# Patient Record
Sex: FEMALE | Race: AMERICAN INDIAN OR ALASKA NATIVE
[De-identification: names, ages, dates, MRNs, and addresses within clinical notes are randomized per-mention and may not be internally consistent; named-entity substitution may affect disease eponyms.]

---

## 2020-03-03 ENCOUNTER — HOSPITAL ENCOUNTER (EMERGENCY)
Dept: HOSPITAL 5 - ED | Age: 25
LOS: 1 days | Discharge: HOME | End: 2020-03-04
Payer: SELF-PAY

## 2020-03-03 DIAGNOSIS — R11.2: ICD-10-CM

## 2020-03-03 DIAGNOSIS — J02.9: ICD-10-CM

## 2020-03-03 DIAGNOSIS — Z79.899: ICD-10-CM

## 2020-03-03 DIAGNOSIS — R50.9: ICD-10-CM

## 2020-03-03 DIAGNOSIS — E86.0: Primary | ICD-10-CM

## 2020-03-03 DIAGNOSIS — R00.0: ICD-10-CM

## 2020-03-03 PROCEDURE — 36415 COLL VENOUS BLD VENIPUNCTURE: CPT

## 2020-03-03 PROCEDURE — 93010 ELECTROCARDIOGRAM REPORT: CPT

## 2020-03-03 PROCEDURE — 96361 HYDRATE IV INFUSION ADD-ON: CPT

## 2020-03-03 PROCEDURE — 96374 THER/PROPH/DIAG INJ IV PUSH: CPT

## 2020-03-03 PROCEDURE — 84703 CHORIONIC GONADOTROPIN ASSAY: CPT

## 2020-03-03 PROCEDURE — 87430 STREP A AG IA: CPT

## 2020-03-03 PROCEDURE — 85027 COMPLETE CBC AUTOMATED: CPT

## 2020-03-03 PROCEDURE — 87400 INFLUENZA A/B EACH AG IA: CPT

## 2020-03-03 PROCEDURE — 86308 HETEROPHILE ANTIBODY SCREEN: CPT

## 2020-03-03 PROCEDURE — 96375 TX/PRO/DX INJ NEW DRUG ADDON: CPT

## 2020-03-03 PROCEDURE — 93005 ELECTROCARDIOGRAM TRACING: CPT

## 2020-03-03 PROCEDURE — 87116 MYCOBACTERIA CULTURE: CPT

## 2020-03-03 PROCEDURE — 99283 EMERGENCY DEPT VISIT LOW MDM: CPT

## 2020-03-03 PROCEDURE — 80053 COMPREHEN METABOLIC PANEL: CPT

## 2020-03-04 VITALS — SYSTOLIC BLOOD PRESSURE: 121 MMHG | DIASTOLIC BLOOD PRESSURE: 66 MMHG

## 2020-03-04 LAB
ALBUMIN SERPL-MCNC: 4.5 G/DL (ref 3.9–5)
ALT SERPL-CCNC: 25 UNITS/L (ref 7–56)
BUN SERPL-MCNC: 9 MG/DL (ref 7–17)
BUN/CREAT SERPL: 13 %
CALCIUM SERPL-MCNC: 9.7 MG/DL (ref 8.4–10.2)
HCT VFR BLD CALC: 41.5 % (ref 30.3–42.9)
HEMOLYSIS INDEX: 7
HGB BLD-MCNC: 13.8 GM/DL (ref 10.1–14.3)
MCHC RBC AUTO-ENTMCNC: 33 % (ref 30–34)
MCV RBC AUTO: 80 FL (ref 79–97)
PLATELET # BLD: 201 K/MM3 (ref 140–440)
RBC # BLD AUTO: 5.17 M/MM3 (ref 3.65–5.03)

## 2020-03-04 NOTE — EMERGENCY DEPARTMENT REPORT
ED Fever HPI





- General


Chief Complaint: Sore Throat


Stated Complaint: SORE THROAT  VOMITING  FEVER  CONSTIPATION


Time Seen by Provider: 03/04/20 00:11


Source: patient


Exam Limitations: no limitations





- History of Present Illness


Initial Comments: 





Patient is a 24-year-old female that presents emergency room with complaints of 

sore throat, nausea vomiting, fever.  Patient states she is also having 

constipation.  Patient denies abdominal pain.  Patient denies blood in her 

vomitus.  Patient states that she has not been able to hold anything down for 2 

full days.  Patient states her throat pain is a 10 out of 10.  Patient states 

that it is worse with swallowing and eating.  Patient states is better with 

rest.  Patient states her fever is low-grade.  Patient denies taking any type of

over-the-counter medications.  Patient denies travel outside the country.  

Patient denies contact with people who have traveled to [place].


Fever Severity/Quality: low grade


Fever Therapy PTA: none


Associated Symptoms: cough, nausea/vomiting, sore throat.  denies: abdominal 

pain, chest pain, confusion, diaphoresis, headache, muscle aches, rash, 

shortness of breath, stiff neck, syncope, weakness





ED Review of Systems


ROS: 


Stated complaint: SORE THROAT  VOMITING  FEVER  CONSTIPATION


Other details as noted in HPI





Constitutional: chills, fever


Eyes: denies: eye pain, eye discharge, vision change


ENT: throat pain.  denies: ear pain


Respiratory: cough.  denies: shortness of breath, wheezing


Cardiovascular: denies: chest pain, palpitations


Endocrine: no symptoms reported


Gastrointestinal: nausea, vomiting, constipation.  denies: abdominal pain, 

diarrhea


Genitourinary: denies: urgency, dysuria, discharge


Musculoskeletal: denies: back pain, joint swelling, arthralgia


Skin: denies: rash, lesions


Neurological: denies: headache, weakness, paresthesias


Psychiatric: denies: anxiety, depression


Hematological/Lymphatic: denies: easy bleeding, easy bruising





ED Past Medical Hx





- Past Medical History


Previous Medical History?: No





- Surgical History


Past Surgical History?: No





- Family History


Family history: no significant





- Social History


Smoking Status: Never Smoker


Substance Use Type: None





- Medications


Home Medications: 


                                Home Medications











 Medication  Instructions  Recorded  Confirmed  Last Taken  Type


 


Fluconazole [Diflucan] 150 mg PO QDAY 1 Days #1 tablet 03/16/19  Unknown Rx


 


metroNIDAZOLE [Metronidazole] 500 mg PO Q8H 7 Days #21 tablet 03/16/19  Unknown 

Rx


 


Azithromycin [Zithromax Tri-Aquiles] 500 mg PO DAILY 3 Days #3 tablet 03/04/20  

Unknown Rx


 


Ondansetron [Zofran Odt] 4 mg PO Q6HR PRN #20 tab.rapdis 03/04/20  Unknown Rx


 


methylPREDNISolone [Medrol 4MG 4 mg PO DAILY 6 Days #1 tab.ds.pk 03/04/20  

Unknown Rx





DOSEPAK (21 tabs)]     














ED Physical Exam





- General


Limitations: No Limitations


General appearance: alert, in no apparent distress





- Head


Head exam: Present: atraumatic, normocephalic





- Eye


Eye exam: Present: normal appearance





- ENT


ENT exam: Present: mucous membranes dry, other





- Expanded ENT Exam


  ** Expanded


Throat exam: Positive: tonsillar erythema, other (Oropharynx redness.).  

Negative: tonsillomegaly, tonsillar exudate, R peritonsillar mass, L 

peritonsillar mass





- Neck


Neck exam: Present: normal inspection





- Respiratory


Respiratory exam: Present: normal lung sounds bilaterally.  Absent: respiratory 

distress, wheezes, rales, rhonchi, stridor, chest wall tenderness, accessory 

muscle use, decreased breath sounds





- Cardiovascular


Cardiovascular Exam: Present: regular rate, normal rhythm.  Absent: systolic 

murmur, diastolic murmur, rubs, gallop





- GI/Abdominal


GI/Abdominal exam: Present: soft, normal bowel sounds





- Extremities Exam


Extremities exam: Present: normal inspection





- Back Exam


Back exam: Present: normal inspection





- Neurological Exam


Neurological exam: Present: alert, oriented X3





- Psychiatric


Psychiatric exam: Present: normal affect, normal mood





- Skin


Skin exam: Present: warm, dry, intact, normal color.  Absent: rash





ED Course


                                   Vital Signs











  03/03/20 03/04/20 03/04/20





  23:45 00:15 00:30


 


Temperature 99.9 F H  


 


Pulse Rate 146 H 135 H 135 H


 


Respiratory 18 13 25 H





Rate   


 


Blood Pressure 118/80 143/87 127/83


 


O2 Sat by Pulse 95 97 96





Oximetry   














- Reevaluation(s)


Reevaluation #1: 


Initial evaluation done.  EKG shows sinus tachycardia.  Patient will be given 

fluids.


03/04/20 00:11





Reevaluation #2: 


Full started and patient's heart rate is already decreased.


03/04/20 00:39








Heart rate at 125.  Patient states she is feeling a little bit better.


03/04/20 01:03





Reevaluation #3: 


Patient's heart rate continues to improve.  Patient states she does not have any

vomiting but would like something to help with nausea and her throat pain.


03/04/20 01:49





Reevaluation #4: 


Patient states she is feeling much better.  Patient denies fever.  Patient den

ies pain.  Patient states her throat is almost pain-free.  Patient stable for 

discharge.  I discussed all results with patient.  Discussed plan of care with 

patient.  Patient agrees with plan of care.  Patient will be discharged home.  

Patient stable for discharge.  Patient given discharge instructions.  Patient 

voiced understanding of discharge instructions.


03/04/20 03:23








ED Medical Decision Making





- Lab Data


Result diagrams: 


                                 03/04/20 00:32





                                 03/04/20 00:32





- EKG Data


-: EKG Interpreted by Me


EKG shows normal: sinus rhythm, axis, intervals, QRS complexes, ST-T waves


Rate: tachycardia





- Medical Decision Making





Patient is a 24-year-old female that presents emergency room with complaints of 

fever, sore throat, cough.  Patient found to have a severely red throat on 

examination.  Patient also found to have sinus tachycardia with a rate of 42 on 

initial EKG.  Patient was given 2 L of fluids and her heart rate responded well.

 Patient's pain almost resolved just prior to discharge.  Patient given a Z-Aquiles 

and steroids for her pharyngitis.  Patient's fever improved with treatment.  

Patient's labs essentially unremarkable.  Patient's flu negative.  Patient strep

and mono negative.  Patient stable for discharge and discharged home.





- Differential Diagnosis


URI, pharyngitis, strep, flu, tachycardia, dehydration


Critical Care Time: Yes


Critical care time in (mins) excluding proc time.: 35


Critical care attestation.: 


If time is entered above; I have spent that time in minutes in the direct care 

of this critically ill patient, excluding procedure time.





Critical Care Time: 





35 minutes





ED Disposition


Clinical Impression: 


 Tachycardia, Sore throat, Dehydration, Nausea





Pharyngitis


Qualifiers:


 Pharyngitis/tonsillitis etiology: other specified organisms Qualified Code(s): 

J02.8 - Acute pharyngitis due to other specified organisms





Fever


Qualifiers:


 Fever type: unspecified Qualified Code(s): R50.9 - Fever, unspecified





Disposition: DC-01 TO HOME OR SELFCARE


Is pt being admited?: No


Does the pt Need Aspirin: No


Condition: Stable


Instructions:  Pharyngitis (ED), Fever in Adults (ED), Dehydration (ED)


Additional Instructions: 


Patient to follow-up with primary care in 2 to 3 days.    Patient to rest.  

Patient to increase water.  Patient to take Tylenol or ibuprofen as needed for 

pain.  Patient to take meds as directed.  Patient to return to the ER if 

condition worsens, changes or new symptoms arise.


Prescriptions: 


methylPREDNISolone [Medrol 4MG DOSEPAK (21 tabs)] 4 mg PO DAILY 6 Days #1 

tab.ds.pk


Azithromycin [Zithromax Tri-Aquiles] 500 mg PO DAILY 3 Days #3 tablet


Ondansetron [Zofran Odt] 4 mg PO Q6HR PRN #20 tab.rapdis


 PRN Reason: Nausea And Vomiting


Referrals: 


CHRISTOPHE BASURTO MD [Staff Physician] - 2-3 Days


Time of Disposition: 03:30

## 2021-02-16 ENCOUNTER — HOSPITAL ENCOUNTER (EMERGENCY)
Dept: HOSPITAL 5 - ED | Age: 26
LOS: 1 days | End: 2021-02-17
Payer: SELF-PAY

## 2021-02-16 DIAGNOSIS — O26.891: Primary | ICD-10-CM

## 2021-02-16 DIAGNOSIS — Z79.899: ICD-10-CM

## 2021-02-16 DIAGNOSIS — R55: ICD-10-CM

## 2021-02-16 DIAGNOSIS — Z3A.01: ICD-10-CM

## 2021-02-16 LAB
ALBUMIN SERPL-MCNC: 4.9 G/DL (ref 3.9–5)
ALT SERPL-CCNC: 18 UNITS/L (ref 7–56)
BASOPHILS # (AUTO): 0 K/MM3 (ref 0–0.1)
BASOPHILS NFR BLD AUTO: 0.4 % (ref 0–1.8)
BILIRUB UR QL STRIP: (no result)
BLOOD UR QL VISUAL: (no result)
BUN SERPL-MCNC: 13 MG/DL (ref 7–17)
BUN/CREAT SERPL: 22 %
CALCIUM SERPL-MCNC: 10.2 MG/DL (ref 8.4–10.2)
EOSINOPHIL # BLD AUTO: 0.2 K/MM3 (ref 0–0.4)
EOSINOPHIL NFR BLD AUTO: 2 % (ref 0–4.3)
HCT VFR BLD CALC: 44.5 % (ref 30.3–42.9)
HEMOLYSIS INDEX: 3
HGB BLD-MCNC: 14.7 GM/DL (ref 10.1–14.3)
LYMPHOCYTES # BLD AUTO: 2 K/MM3 (ref 1.2–5.4)
LYMPHOCYTES NFR BLD AUTO: 25.4 % (ref 13.4–35)
MCHC RBC AUTO-ENTMCNC: 33 % (ref 30–34)
MCV RBC AUTO: 82 FL (ref 79–97)
MONOCYTES # (AUTO): 0.6 K/MM3 (ref 0–0.8)
MONOCYTES % (AUTO): 7.8 % (ref 0–7.3)
MUCOUS THREADS #/AREA URNS HPF: (no result) /HPF
PH UR STRIP: 5 [PH] (ref 5–7)
PLATELET # BLD: 256 K/MM3 (ref 140–440)
PROT UR STRIP-MCNC: (no result) MG/DL
RBC # BLD AUTO: 5.42 M/MM3 (ref 3.65–5.03)
RBC #/AREA URNS HPF: 1 /HPF (ref 0–6)
UROBILINOGEN UR-MCNC: < 2 MG/DL (ref ?–2)
WBC #/AREA URNS HPF: < 1 /HPF (ref 0–6)

## 2021-02-16 PROCEDURE — 83690 ASSAY OF LIPASE: CPT

## 2021-02-16 PROCEDURE — 82550 ASSAY OF CK (CPK): CPT

## 2021-02-16 PROCEDURE — 85025 COMPLETE CBC W/AUTO DIFF WBC: CPT

## 2021-02-16 PROCEDURE — 84484 ASSAY OF TROPONIN QUANT: CPT

## 2021-02-16 PROCEDURE — 84703 CHORIONIC GONADOTROPIN ASSAY: CPT

## 2021-02-16 PROCEDURE — 81001 URINALYSIS AUTO W/SCOPE: CPT

## 2021-02-16 PROCEDURE — 76801 OB US < 14 WKS SINGLE FETUS: CPT

## 2021-02-16 PROCEDURE — 36415 COLL VENOUS BLD VENIPUNCTURE: CPT

## 2021-02-16 PROCEDURE — 85379 FIBRIN DEGRADATION QUANT: CPT

## 2021-02-16 PROCEDURE — 93005 ELECTROCARDIOGRAM TRACING: CPT

## 2021-02-16 PROCEDURE — 84702 CHORIONIC GONADOTROPIN TEST: CPT

## 2021-02-16 PROCEDURE — 82553 CREATINE MB FRACTION: CPT

## 2021-02-16 PROCEDURE — 80053 COMPREHEN METABOLIC PANEL: CPT
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
## 2021-02-17 VITALS — DIASTOLIC BLOOD PRESSURE: 81 MMHG | SYSTOLIC BLOOD PRESSURE: 115 MMHG

## 2021-02-17 NOTE — ULTRASOUND REPORT
ULTRASOUND OBSTETRIC 



INDICATION / CLINICAL INFORMATION:

Pregnant, lower abdominal pain, syncope.



TECHNIQUE:

Transabdominal.



COMPARISON:

None available.



FINDINGS:

No IUP.



ADNEXA: Complex right ovarian corpus luteal cyst measures 2.1 x 2.2 x 2.0 cm. Complex left ovarian co
rpus luteal cyst measures 5.4 x 5.0 x 4.5 cm containing internal debris and septations.

FREE FLUID: None.



ADDITIONAL FINDINGS: None.



IMPRESSION:

1. No visualized IUP.

2. Complex bilateral ovarian corpus luteal cysts



Signer Name: Ronen Buitrago MD 

Signed: 2/17/2021 2:35 AM

Workstation Name: [id number]

## 2021-03-01 ENCOUNTER — HOSPITAL ENCOUNTER (EMERGENCY)
Dept: HOSPITAL 5 - ED | Age: 26
Discharge: HOME | End: 2021-03-01
Payer: COMMERCIAL

## 2021-03-01 VITALS — DIASTOLIC BLOOD PRESSURE: 84 MMHG | SYSTOLIC BLOOD PRESSURE: 124 MMHG

## 2021-03-01 DIAGNOSIS — Z3A.01: ICD-10-CM

## 2021-03-01 DIAGNOSIS — Z79.899: ICD-10-CM

## 2021-03-01 DIAGNOSIS — Z79.2: ICD-10-CM

## 2021-03-01 DIAGNOSIS — O20.0: Primary | ICD-10-CM

## 2021-03-01 DIAGNOSIS — O23.41: ICD-10-CM

## 2021-03-01 LAB
BACTERIA #/AREA URNS HPF: (no result) /HPF
BILIRUB UR QL STRIP: (no result)
BLOOD UR QL VISUAL: (no result)
MUCOUS THREADS #/AREA URNS HPF: (no result) /HPF
PH UR STRIP: 5 [PH] (ref 5–7)
PROT UR STRIP-MCNC: (no result) MG/DL
RBC #/AREA URNS HPF: 2 /HPF (ref 0–6)
UROBILINOGEN UR-MCNC: 2 MG/DL (ref ?–2)
WBC #/AREA URNS HPF: 2 /HPF (ref 0–6)

## 2021-03-01 PROCEDURE — 81001 URINALYSIS AUTO W/SCOPE: CPT

## 2021-03-01 PROCEDURE — 84702 CHORIONIC GONADOTROPIN TEST: CPT

## 2021-03-01 PROCEDURE — 85461 HEMOGLOBIN FETAL: CPT

## 2021-03-01 PROCEDURE — 76801 OB US < 14 WKS SINGLE FETUS: CPT

## 2021-03-01 PROCEDURE — 86900 BLOOD TYPING SEROLOGIC ABO: CPT

## 2021-03-01 PROCEDURE — 87086 URINE CULTURE/COLONY COUNT: CPT

## 2021-03-01 PROCEDURE — 86901 BLOOD TYPING SEROLOGIC RH(D): CPT

## 2021-03-01 PROCEDURE — 76817 TRANSVAGINAL US OBSTETRIC: CPT

## 2021-03-01 PROCEDURE — 36415 COLL VENOUS BLD VENIPUNCTURE: CPT

## 2021-03-01 PROCEDURE — 86850 RBC ANTIBODY SCREEN: CPT

## 2021-03-01 NOTE — EMERGENCY DEPARTMENT REPORT
<GALISUKHILIZABETHElsa DAS - Last Filed: 21 14:02>





ED Female  HPI





- General


Chief complaint: Vaginal Bleeding


Stated complaint: PREG/BLEEDING/SEEN HERE LAST WEEK


Source: patient


Mode of arrival: Ambulatory


Limitations: No Limitations





- History of Present Illness


Initial comments: 





25-year-old -American female who is  1 para 0 with a last m

enstrual period of 2021 presents to the emergency room complaining of lower

abdominal pain with vaginal spotting.  Patient was seen here 2 weeks ago and was

having very heavy menstrual bleeding.  Patient never followed up with provider 

stating that her Medicaid was not active.  Patient comes in complaining of 

burning with urination.


MD Complaint: vaginal bleeding, pelvic pain





- Related Data


                                  Previous Rx's











 Medication  Instructions  Recorded  Last Taken  Type


 


Fluconazole [Diflucan] 150 mg PO QDAY 1 Days #1 tablet 19 Unknown Rx


 


metroNIDAZOLE [Metronidazole] 500 mg PO Q8H 7 Days #21 tablet 19 Unknown 

Rx


 


Azithromycin [Zithromax Tri-Aquiles] 500 mg PO DAILY 3 Days #3 tablet 20 

Unknown Rx


 


Ondansetron [Zofran Odt] 4 mg PO Q6HR PRN #20 tab.rapdis 20 Unknown Rx


 


methylPREDNISolone [Medrol 4MG 4 mg PO DAILY 6 Days #1 tab.ds.pk 20 

Unknown Rx





DOSEPAK (21 tabs)]    


 


cephALEXin [Keflex] 500 mg PO Q8HR #21 capsule 21 Unknown Rx











                                    Allergies











Allergy/AdvReac Type Severity Reaction Status Date / Time


 


No Known Allergies Allergy   Verified 21 13:21














ED Review of Systems


Comment: All other systems reviewed and negative





ED Past Medical Hx





- Past Medical History


Previous Medical History?: No





- Surgical History


Past Surgical History?: No





- Social History


Smoking Status: Never Smoker


Substance Use Type: None (Denies illicit drug use)





- Medications


Home Medications: 


                                Home Medications











 Medication  Instructions  Recorded  Confirmed  Last Taken  Type


 


Fluconazole [Diflucan] 150 mg PO QDAY 1 Days #1 tablet 19  Unknown Rx


 


metroNIDAZOLE [Metronidazole] 500 mg PO Q8H 7 Days #21 tablet 19  Unknown 

Rx


 


Azithromycin [Zithromax Tri-Aquiles] 500 mg PO DAILY 3 Days #3 tablet 20  

Unknown Rx


 


Ondansetron [Zofran Odt] 4 mg PO Q6HR PRN #20 tab.rapdis 20  Unknown Rx


 


methylPREDNISolone [Medrol 4MG 4 mg PO DAILY 6 Days #1 tab.ds.pk 20  

Unknown Rx





DOSEPAK (21 tabs)]     


 


cephALEXin [Keflex] 500 mg PO Q8HR #21 capsule 21  Unknown Rx














ED Physical Exam





- General


Limitations: No Limitations





ED Medical Decision Making





- Medical Decision Making





25-year-old -American female who is  1 para 0 with a last 

menstrual period of 2021 presents to the emergency room complaining of 

lower abdominal pain with vaginal spotting.  Patient was seen here 2 weeks ago 

and was having very heavy menstrual bleeding.  Patient never followed up with 

provider stating that her Medicaid was not active.  Patient comes in complaining

 of burning with urination.





ED Disposition


Clinical Impression: 


 Threatened miscarriage in early pregnancy, UTI (urinary tract infection)





Disposition:  TO HOME OR SELFCARE


Condition: Stable


Instructions:  Urinary Tract Infection, Adult, Easy-to-Read, Threatened 

Miscarriage, Easy-to-Read


Additional Instructions: 


You can take tylenol as needed for pain. Take the antibiotics as prescribed. 

Follow up with [facility] in 2 days for repeat Quant HCG and repeat US, 

if unable to f/u return to ED in 2 days. Return sooner if symptoms worsens. 


Prescriptions: 


cephALEXin [Keflex] 500 mg PO Q8HR #21 capsule


Referrals: 


PRIMARY CARE,MD [Primary Care Provider] - 3-5 Days


Forms:  Work/School Release Form(ED)





<KYA WINSLOW - Last Filed: 21 02:13>





ED Review of Systems


ROS: 


Stated complaint: PREG/BLEEDING/SEEN HERE LAST WEEK


Other details as noted in HPI








ED Course


                                   Vital Signs











  21





  13:23 21:56


 


Temperature 98.4 F 


 


Pulse Rate 95 H 80


 


Respiratory 18 18





Rate  


 


Blood Pressure 131/76 


 


Blood Pressure  124/84





[Right]  


 


O2 Sat by Pulse 100 100





Oximetry  














ED Medical Decision Making





- Radiology Data


Radiology results: report reviewed


Findings





[facility] 


[address] [place] [address] 





Ultrasound Report 


Signed 





 Patient: ELBERT LEWIS MR#: 


 [id number] 


 : 1995 Acct:[id number] 





 Age/Sex: 25 / F ADM Date: 21 





 Loc: ED 


 Attending Dr: 








 Ordering Physician: ROSALINDA IPLLAI 


 Date of Service: 21 


 Procedure(s): US OB <= 14 weeks fetus 


 Accession Number(s): [id number] 





 cc: ROSALINDA PILLAI 








 OB Ultrasound 





HISTORY: Pregnant lower abdominal pain. 





TECHNIQUE: Grayscale and color imaging performed. 





COMPARISON: OB ultrasound from 2021 





FINDINGS: Uterus measures 9.6 x 4.5 x 6.1 cm with endometrial echocomplex 

appearing thickened and 


 measuring 2.1 cm. There is a small cystic structure in the uterine fundal 

region with diameter of 8 


 mm which would correspond with an EGA of 5 weeks and 4 days. No fetal pole or 

heart rate identified.


 There is also a crescentic slightly hypoechoic structure adjacent to this 

possible gestational sac 


 measuring 2.1 cm in maximal dimension which may represent a small subchorionic 

hemorrhage. 





Right ovary measures 5.7 x 2.9 x 4.6 cm and contains a simple cyst measuring 2.9

 cm in maximal 


 dimension. Left ovary measures 8.4 x 8.6 x 8.2 cm with a simple cyst measuring 

7.5 cm in maximal 


 dimension. No significant pelvic free fluid identified. 








IMPRESSION: 


1. Intrauterine cystic structure with no fetal pole could represent an early 

gestational sac. 


 Correlate with beta hCG level and serial ultrasound as indicated. Irregular 

adjacent hypoechoic 


 structure may represent a subchorionic hemorrhage. 


2. Simple bilateral ovarian cysts. 





Signer Name: Tony Rodriguez MD 


Signed: 3/1/2021 5:39 PM 


Workstation Name: [email or web address] 








 Transcribed By: IESHA 


 Dictated By: Tony Rodriguez MD 


 Electronically Authenticated By: Tony Rodriguez MD 


 Signed Date/Time: 21 











 DD/DT: 21 


 TD/TT: 








- Medical Decision Making


: Patient was initially seen by Lizabeth Carpenter.  She turned over the case to

 Dominick BAILON at shift change which was about 1730. US was pending. Per nurse 

and Dominick patient was called several times for re-assessment but no there 

was no answer from patient and she was assumed to have Eloped. Then around ,

 nurse brought chart to me stating that they had found patient in waiting room 

and nurse requested that I see patient. 





Patient reported to me that she has been having intermittent lower abd cramping 

x 2 weeks but she started with spotting today. She states since she has been in 

ER spotting has resolved. She also reports dysuria. She is G1 P 0. Her last MC 

was 21. She states she is still waiting to get approved for her medicaid 

and therefore has not had a prenatal appointment as yet but she is hoping to go 

to [facility]. 





: patient well appearing, not toxic, and not in any distress. She 

neurologically intact with normal gait in ED. She appears well hydrated.  Repeat

 VS stable. Mild diffuse lower abd ttp but abd soft without guarding, rigidity 

or rebound. Labs/US reviewed. Quant now 3798 compared to 33.71 on 21. 


Ultrasound today shows Intrauterine cystic structure with no fetal pole could 

represent an early gestational sac. Correlate with beta hCG level and serial 

ultrasound as indicated. Irregular adjacent hypoechoic structure may represent a

 subchorionic hemorrhage. 2. Simple bilateral ovarian cysts.  UA concerning for 

UTI.  Urine culture is pending.  RhoGam is A positive so no indication for any 

RhoGam at this time.





Discussed lab results and ultrasound results with patient.  Recommend that she 

follows up with outside medical in the next 2 days for repeat quant and repeat 

ultrasound.  Also informed her of the concern for possible UTI and that she will

 be started on antibiotics.  Discussed the suspected diagnosis with patient.  

She is not toxic, not ill-appearing, and currently in no acute distress.  Her 

vital signs have been stable.  She is neurologically intact with a normal gait. 

 She appears well-hydrated.  No indication for any further work-up, admission or

 emergent consult at this time.  Patient expressed understanding of instructions

 and agree with plan.  Patient was stable at time of discharge.


Critical care attestation.: 


If time is entered above; I have spent that time in minutes in the direct care 

of this critically ill patient, excluding procedure time.








ED Disposition


Is pt being admited?: No


Does the pt Need Aspirin: No


Time of Disposition: 21:48

## 2021-03-01 NOTE — ULTRASOUND REPORT
OB Ultrasound



HISTORY: Pregnant lower abdominal pain.



TECHNIQUE:  Grayscale and color  imaging performed.



COMPARISON:  OB ultrasound from 2/17/2021



FINDINGS: Uterus measures 9.6 x 4.5 x 6.1 cm with endometrial echocomplex appearing thickened and silvina
suring 2.1 cm. There is a small cystic structure in the uterine fundal region with diameter of 8 mm w
hich would correspond with an EGA of 5 weeks and 4 days. No fetal pole or heart rate identified. Ther
e is also a crescentic slightly hypoechoic structure adjacent to this possible gestational sac measur
ing 2.1 cm in maximal dimension which may represent a small subchorionic hemorrhage.



Right ovary measures 5.7 x 2.9 x 4.6 cm and contains a simple cyst measuring 2.9 cm in maximal dimens
ion. Left ovary measures 8.4 x 8.6 x 8.2 cm with a simple cyst measuring 7.5 cm in maximal dimension.
 No significant pelvic free fluid identified.





IMPRESSION: 

1. Intrauterine cystic structure with no fetal pole could represent an early gestational sac. Correla
te with beta hCG level and serial ultrasound as indicated. Irregular adjacent hypoechoic structure ma
y represent a subchorionic hemorrhage.

2. Simple bilateral ovarian cysts.



Signer Name: Tony Rodriguez MD 

Signed: 3/1/2021 5:39 PM

Workstation Name: [email or web address]

## 2021-03-01 NOTE — ULTRASOUND REPORT
OB Ultrasound



HISTORY: Pregnant lower abdominal pain.



TECHNIQUE:  Grayscale and color  imaging performed.



COMPARISON:  OB ultrasound from 2/17/2021



FINDINGS: Uterus measures 9.6 x 4.5 x 6.1 cm with endometrial echocomplex appearing thickened and silvina
suring 2.1 cm. There is a small cystic structure in the uterine fundal region with diameter of 8 mm w
hich would correspond with an EGA of 5 weeks and 4 days. No fetal pole or heart rate identified. Ther
e is also a crescentic slightly hypoechoic structure adjacent to this possible gestational sac measur
ing 2.1 cm in maximal dimension which may represent a small subchorionic hemorrhage.



Right ovary measures 5.7 x 2.9 x 4.6 cm and contains a simple cyst measuring 2.9 cm in maximal dimens
ion. Left ovary measures 8.4 x 8.6 x 8.2 cm with a simple cyst measuring 7.5 cm in maximal dimension.
 No significant pelvic free fluid identified.





IMPRESSION: 

1. Intrauterine cystic structure with no fetal pole could represent an early gestational sac. Correla
te with beta hCG level and serial ultrasound as indicated. Irregular adjacent hypoechoic structure ma
y represent a subchorionic hemorrhage.

2. Simple bilateral ovarian cysts.



Signer Name: Tony Rodriguez MD 

Signed: 3/1/2021 5:39 PM

Workstation Name: [employer]

## 2021-07-29 ENCOUNTER — HOSPITAL ENCOUNTER (OUTPATIENT)
Dept: HOSPITAL 5 - APU | Age: 26
Discharge: HOME | End: 2021-07-29
Attending: OBSTETRICS & GYNECOLOGY
Payer: COMMERCIAL

## 2021-07-29 VITALS — SYSTOLIC BLOOD PRESSURE: 99 MMHG | DIASTOLIC BLOOD PRESSURE: 65 MMHG

## 2021-07-29 DIAGNOSIS — O26.892: ICD-10-CM

## 2021-07-29 DIAGNOSIS — F32.9: ICD-10-CM

## 2021-07-29 DIAGNOSIS — O99.342: ICD-10-CM

## 2021-07-29 DIAGNOSIS — M54.9: ICD-10-CM

## 2021-07-29 DIAGNOSIS — F41.9: ICD-10-CM

## 2021-07-29 DIAGNOSIS — O21.2: Primary | ICD-10-CM

## 2021-07-29 DIAGNOSIS — Z3A.26: ICD-10-CM

## 2021-07-29 LAB
BACTERIA #/AREA URNS HPF: (no result) /HPF
BILIRUB UR QL STRIP: (no result)
BLOOD UR QL VISUAL: (no result)
HYALINE CASTS #/AREA URNS LPF: 1 /LPF
MUCOUS THREADS #/AREA URNS HPF: (no result) /HPF
PH UR STRIP: 7 [PH] (ref 5–7)
RBC #/AREA URNS HPF: 1 /HPF (ref 0–6)
UROBILINOGEN UR-MCNC: < 2 MG/DL (ref ?–2)
WBC #/AREA URNS HPF: 6 /HPF (ref 0–6)

## 2021-07-29 PROCEDURE — 96360 HYDRATION IV INFUSION INIT: CPT

## 2021-07-29 PROCEDURE — 96365 THER/PROPH/DIAG IV INF INIT: CPT

## 2021-07-29 PROCEDURE — 59025 FETAL NON-STRESS TEST: CPT

## 2021-07-29 PROCEDURE — 81001 URINALYSIS AUTO W/SCOPE: CPT

## 2021-07-29 PROCEDURE — 96361 HYDRATE IV INFUSION ADD-ON: CPT

## 2021-08-06 ENCOUNTER — HOSPITAL ENCOUNTER (OUTPATIENT)
Dept: HOSPITAL 5 - TRG | Age: 26
Discharge: HOME | End: 2021-08-06
Attending: OBSTETRICS & GYNECOLOGY
Payer: COMMERCIAL

## 2021-08-06 VITALS — SYSTOLIC BLOOD PRESSURE: 106 MMHG | DIASTOLIC BLOOD PRESSURE: 68 MMHG

## 2021-08-06 DIAGNOSIS — O26.892: Primary | ICD-10-CM

## 2021-08-06 DIAGNOSIS — Z3A.27: ICD-10-CM

## 2021-08-06 DIAGNOSIS — R10.9: ICD-10-CM

## 2021-08-06 LAB
BACTERIA #/AREA URNS HPF: (no result) /HPF
BILIRUB UR QL STRIP: (no result)
BLOOD UR QL VISUAL: (no result)
MUCOUS THREADS #/AREA URNS HPF: (no result) /HPF
PH UR STRIP: 5 [PH] (ref 5–7)
RBC #/AREA URNS HPF: 2 /HPF (ref 0–6)
UROBILINOGEN UR-MCNC: 4 MG/DL (ref ?–2)
WBC #/AREA URNS HPF: 3 /HPF (ref 0–6)

## 2021-08-06 PROCEDURE — 81001 URINALYSIS AUTO W/SCOPE: CPT

## 2021-08-06 PROCEDURE — 59025 FETAL NON-STRESS TEST: CPT

## 2021-08-06 PROCEDURE — 76819 FETAL BIOPHYS PROFIL W/O NST: CPT

## 2021-08-06 PROCEDURE — 76815 OB US LIMITED FETUS(S): CPT

## 2021-08-06 NOTE — ULTRASOUND REPORT
ULTRASOUND FETAL BIOPHYSICAL PROFILE



INDICATION / CLINICAL INFORMATION:

rule out placenta abruption.



COMPARISON:

3/1/2021



FINDINGS:



FETAL BREATHING MOVEMENT = 2

GROSS BODY MOVEMENT = 2

FETAL TONE = 2

QUALITATIVE AMNIOTIC FLUID VOLUME = 2



TOTAL BIOPHYSICAL SCORE = 8/8



AMNIOTIC FLUID INDEX (cm) =  8.2

PRESENTATION: Cephalic. 

FETAL HEART RATE (beats per minute): 146



Additional findings: Posterior fundal placenta is unremarkable. No evidence of placental abruption. 



IMPRESSION:

1. Single live intrauterine pregnancy without significant abnormality.

2. Fetal biophysical profile = 8/8 

3. SANDRO measures 8.2, within normal limits.

 



Signer Name: Marko Conner MD 

Signed: 8/6/2021 11:08 AM

Workstation Name: [id number]

## 2021-08-06 NOTE — ULTRASOUND REPORT
ULTRASOUND FETAL BIOPHYSICAL PROFILE



INDICATION / CLINICAL INFORMATION:

[facility] sandro for fetal well being.



COMPARISON:

3/1/2021



FINDINGS:



FETAL BREATHING MOVEMENT = 2

GROSS BODY MOVEMENT = 2

FETAL TONE = 2

QUALITATIVE AMNIOTIC FLUID VOLUME = 2



TOTAL BIOPHYSICAL SCORE = 8/8



AMNIOTIC FLUID INDEX (cm) =  8.2

PRESENTATION: Cephalic. 

FETAL HEART RATE (beats per minute): 146



Additional findings: Posterior fundal placenta is unremarkable. No evidence of placental abruption. 



IMPRESSION:

1. Single live intrauterine pregnancy without significant abnormality.

2. Fetal biophysical profile = 8/8 

3. SANDRO measures 8.2, within normal limits.

 



Signer Name: Marko Conner MD 

Signed: 8/6/2021 11:07 AM

Workstation Name: [id number]

## 2021-10-08 ENCOUNTER — HOSPITAL ENCOUNTER (OUTPATIENT)
Dept: HOSPITAL 5 - TRG | Age: 26
Discharge: HOME | End: 2021-10-08
Attending: OBSTETRICS & GYNECOLOGY
Payer: COMMERCIAL

## 2021-10-08 VITALS — DIASTOLIC BLOOD PRESSURE: 73 MMHG | SYSTOLIC BLOOD PRESSURE: 122 MMHG

## 2021-10-08 DIAGNOSIS — Z3A.36: ICD-10-CM

## 2021-10-08 DIAGNOSIS — Z34.93: Primary | ICD-10-CM

## 2021-10-08 LAB
BILIRUB UR QL STRIP: (no result)
BLOOD UR QL VISUAL: (no result)
MUCOUS THREADS #/AREA URNS HPF: (no result) /HPF
PH UR STRIP: 6 [PH] (ref 5–7)
RBC #/AREA URNS HPF: 1 /HPF (ref 0–6)
UROBILINOGEN UR-MCNC: < 2 MG/DL (ref ?–2)
WBC #/AREA URNS HPF: 2 /HPF (ref 0–6)

## 2021-10-08 PROCEDURE — 59025 FETAL NON-STRESS TEST: CPT

## 2021-10-08 PROCEDURE — 84112 EVAL AMNIOTIC FLUID PROTEIN: CPT

## 2021-10-08 PROCEDURE — 36415 COLL VENOUS BLD VENIPUNCTURE: CPT

## 2021-10-08 PROCEDURE — 81001 URINALYSIS AUTO W/SCOPE: CPT

## 2021-10-11 ENCOUNTER — HOSPITAL ENCOUNTER (OUTPATIENT)
Dept: HOSPITAL 5 - TRG | Age: 26
Discharge: HOME | End: 2021-10-11
Attending: OBSTETRICS & GYNECOLOGY
Payer: COMMERCIAL

## 2021-10-11 VITALS — SYSTOLIC BLOOD PRESSURE: 124 MMHG | DIASTOLIC BLOOD PRESSURE: 73 MMHG

## 2021-10-11 DIAGNOSIS — Z34.93: Primary | ICD-10-CM

## 2021-10-11 DIAGNOSIS — Z3A.37: ICD-10-CM

## 2021-10-11 PROCEDURE — 76819 FETAL BIOPHYS PROFIL W/O NST: CPT

## 2021-10-11 PROCEDURE — 59025 FETAL NON-STRESS TEST: CPT

## 2021-10-11 NOTE — ULTRASOUND REPORT
ULTRASOUND FETAL BIOPHYSICAL PROFILE



INDICATION:  BPP. Fetal well-being



COMPARISON: 8/6/2021



FINDINGS:



Fetal heart rate is 151 beats per minute.



Fetal breathing movement = 2

Gross body movement = 2

Fetal tone = 2

Qualitative amniotic fluid volume = 2



Additional findings: Cephalic presentation.





IMPRESSION:

Fetal biophysical profile = 8/8



Signer Name: Jimmy Gould Jr, MD 

Signed: 10/11/2021 10:25 AM

Workstation Name: [id number]

## 2021-10-19 ENCOUNTER — HOSPITAL ENCOUNTER (OUTPATIENT)
Dept: HOSPITAL 5 - TRG | Age: 26
Discharge: HOME | End: 2021-10-19
Attending: OBSTETRICS & GYNECOLOGY
Payer: COMMERCIAL

## 2021-10-19 VITALS — DIASTOLIC BLOOD PRESSURE: 76 MMHG | SYSTOLIC BLOOD PRESSURE: 124 MMHG

## 2021-10-19 DIAGNOSIS — Z3A.38: ICD-10-CM

## 2021-10-19 DIAGNOSIS — O36.8130: Primary | ICD-10-CM

## 2021-10-19 PROCEDURE — 76819 FETAL BIOPHYS PROFIL W/O NST: CPT

## 2021-10-19 PROCEDURE — 76815 OB US LIMITED FETUS(S): CPT

## 2021-10-19 PROCEDURE — 59025 FETAL NON-STRESS TEST: CPT

## 2021-10-19 NOTE — ULTRASOUND REPORT
ULTRASOUND OBSTETRIC LIMITED WITH BPP



INDICATION / CLINICAL INFORMATION:

DECREASED FETAL HEARTRATE, LOW HEART RATE - SANDRO.

Clinical Gestational Age (GA) in weeks.days: 38 weeks and 3 days. 



TECHNIQUE: Transabdominal.



COMPARISON: 8/6/2021.



FINDINGS:



FETAL NUMBER: Single

FETAL PRESENTATION: cephalic

PLACENTA: Posterior fundal. No evidence of placental abruption.

AMNIOTIC FLUID VOLUME: normal 

AMNIOTIC FLUID INDEX (SANDRO) in cm (if measured): 10.8 cm



Cardiac activity was detected at 122 bpm.



Biophysical profile score of 8 out of 8 was calculated with 2 points each for fetal breathing movemen
ts, fetal movements, fetal posture and tone, and amniotic fluid volume.



IMPRESSION:



Single live intrauterine pregnancy in the cephalic presentation with estimated gestational age of 38 
weeks and 3 days.



Biophysical profile score of 8 out of 8.



Cardiac activity is detected at 122 bpm. This is within the lower limits of normal.



Signer Name: Cruz Orantes MD 

Signed: 10/19/2021 11:15 AM

Workstation Name: [id number]

## 2021-10-19 NOTE — ULTRASOUND REPORT
ULTRASOUND OBSTETRIC LIMITED WITH BPP



INDICATION / CLINICAL INFORMATION:

DECREASED FETAL HEARTRATE, LOW HEART RATE - SANDRO.

Clinical Gestational Age (GA) in weeks.days: 38 weeks and 3 days. 



TECHNIQUE: Transabdominal.



COMPARISON: 8/6/2021.



FINDINGS:



FETAL NUMBER: Single

FETAL PRESENTATION: cephalic

PLACENTA: Posterior fundal. No evidence of placental abruption.

AMNIOTIC FLUID VOLUME: normal 

AMNIOTIC FLUID INDEX (SANDRO) in cm (if measured): 10.8 cm



Cardiac activity was detected at 122 bpm.



Biophysical profile score of 8 out of 8 was calculated with 2 points each for fetal breathing movemen
ts, fetal movements, fetal posture and tone, and amniotic fluid volume.



IMPRESSION:



Single live intrauterine pregnancy in the cephalic presentation with estimated gestational age of 38 
weeks and 3 days.



Biophysical profile score of 8 out of 8.



Cardiac activity is detected at 122 bpm. This is within the lower limits of normal.



Signer Name: Cruz Orantes MD 

Signed: 10/19/2021 11:15 AM

Workstation Name: [id number]

## 2021-11-16 ENCOUNTER — HOSPITAL ENCOUNTER (EMERGENCY)
Dept: HOSPITAL 5 - ED | Age: 26
LOS: 1 days | Discharge: HOME | End: 2021-11-17
Payer: COMMERCIAL

## 2021-11-16 ENCOUNTER — HOSPITAL ENCOUNTER (EMERGENCY)
Dept: HOSPITAL 5 - ED | Age: 26
Discharge: HOME | End: 2021-11-16
Payer: COMMERCIAL

## 2021-11-16 VITALS — DIASTOLIC BLOOD PRESSURE: 88 MMHG | SYSTOLIC BLOOD PRESSURE: 136 MMHG

## 2021-11-16 VITALS — DIASTOLIC BLOOD PRESSURE: 86 MMHG | SYSTOLIC BLOOD PRESSURE: 121 MMHG

## 2021-11-16 DIAGNOSIS — M62.830: ICD-10-CM

## 2021-11-16 DIAGNOSIS — R10.9: ICD-10-CM

## 2021-11-16 DIAGNOSIS — M54.9: Primary | ICD-10-CM

## 2021-11-16 DIAGNOSIS — M54.9: ICD-10-CM

## 2021-11-16 DIAGNOSIS — M62.838: ICD-10-CM

## 2021-11-16 DIAGNOSIS — R10.9: Primary | ICD-10-CM

## 2021-11-16 LAB
ALBUMIN SERPL-MCNC: 4.5 G/DL (ref 3.9–5)
ALT SERPL-CCNC: 27 UNITS/L (ref 7–56)
BASOPHILS # (AUTO): 0 K/MM3 (ref 0–0.1)
BASOPHILS NFR BLD AUTO: 0.5 % (ref 0–1.8)
BILIRUB UR QL STRIP: (no result)
BLOOD UR QL VISUAL: (no result)
BUN SERPL-MCNC: 15 MG/DL (ref 7–17)
BUN/CREAT SERPL: 25 %
CALCIUM SERPL-MCNC: 10 MG/DL (ref 8.4–10.2)
EOSINOPHIL # BLD AUTO: 0.3 K/MM3 (ref 0–0.4)
EOSINOPHIL NFR BLD AUTO: 4.7 % (ref 0–4.3)
HCT VFR BLD CALC: 45.7 % (ref 30.3–42.9)
HEMOLYSIS INDEX: 4
HGB BLD-MCNC: 14 GM/DL (ref 10.1–14.3)
LYMPHOCYTES # BLD AUTO: 1.5 K/MM3 (ref 1.2–5.4)
LYMPHOCYTES NFR BLD AUTO: 25.4 % (ref 13.4–35)
MCHC RBC AUTO-ENTMCNC: 31 % (ref 30–34)
MCV RBC AUTO: 79 FL (ref 79–97)
MONOCYTES # (AUTO): 0.4 K/MM3 (ref 0–0.8)
MONOCYTES % (AUTO): 7.3 % (ref 0–7.3)
MUCOUS THREADS #/AREA URNS HPF: (no result) /HPF
PH UR STRIP: 5 [PH] (ref 5–7)
PLATELET # BLD: 253 K/MM3 (ref 140–440)
PROT UR STRIP-MCNC: (no result) MG/DL
RBC # BLD AUTO: 5.79 M/MM3 (ref 3.65–5.03)
RBC #/AREA URNS HPF: 1 /HPF (ref 0–6)
UROBILINOGEN UR-MCNC: < 2 MG/DL (ref ?–2)
WBC #/AREA URNS HPF: 4 /HPF (ref 0–6)

## 2021-11-16 PROCEDURE — 80053 COMPREHEN METABOLIC PANEL: CPT

## 2021-11-16 PROCEDURE — 99284 EMERGENCY DEPT VISIT MOD MDM: CPT

## 2021-11-16 PROCEDURE — 85025 COMPLETE CBC W/AUTO DIFF WBC: CPT

## 2021-11-16 PROCEDURE — 83735 ASSAY OF MAGNESIUM: CPT

## 2021-11-16 PROCEDURE — 85379 FIBRIN DEGRADATION QUANT: CPT

## 2021-11-16 PROCEDURE — 83690 ASSAY OF LIPASE: CPT

## 2021-11-16 PROCEDURE — 96375 TX/PRO/DX INJ NEW DRUG ADDON: CPT

## 2021-11-16 PROCEDURE — 81001 URINALYSIS AUTO W/SCOPE: CPT

## 2021-11-16 PROCEDURE — 99283 EMERGENCY DEPT VISIT LOW MDM: CPT

## 2021-11-16 PROCEDURE — 74177 CT ABD & PELVIS W/CONTRAST: CPT

## 2021-11-16 PROCEDURE — 96365 THER/PROPH/DIAG IV INF INIT: CPT

## 2021-11-16 PROCEDURE — 84703 CHORIONIC GONADOTROPIN ASSAY: CPT

## 2021-11-16 PROCEDURE — 71275 CT ANGIOGRAPHY CHEST: CPT

## 2021-11-16 PROCEDURE — 96361 HYDRATE IV INFUSION ADD-ON: CPT

## 2021-11-16 PROCEDURE — 71046 X-RAY EXAM CHEST 2 VIEWS: CPT

## 2021-11-16 PROCEDURE — 36415 COLL VENOUS BLD VENIPUNCTURE: CPT

## 2021-11-16 PROCEDURE — 81025 URINE PREGNANCY TEST: CPT

## 2021-11-16 NOTE — EMERGENCY DEPARTMENT REPORT
ED General Adult HPI





- General


Chief complaint: Back Pain/Injury


Stated complaint: Vomiting, back pain


Time Seen by Provider: 11/16/21 09:02


Source: patient


Mode of arrival: Ambulatory


Limitations: No Limitations





- History of Present Illness


Initial comments: 





26-year-old -American female patient presents with complaints of mid back

pain and abdominal pain x3 weeks, worsening over the past 24 hours.  She states 

the pain in her back feels like spasms and that it radiates through into her 

abdomen.  Patient states she is 3 weeks postpartum and denies any complications 

from her vaginal delivery.  No other past medical history per patient.  She r

eports nausea and vomiting without hematemesis/coffee-ground emesis.  She also 

denies any fever/chills/sweats or difficulty breathing.


Severity scale (0 -10): 10





- Related Data


                                Home Medications











 Medication  Instructions  Recorded  Confirmed  Last Taken


 


Tylenol See Protocol PO DAILY 11/16/21 11/16/21 Unknown








                                  Previous Rx's











 Medication  Instructions  Recorded  Last Taken  Type


 


methocarbamoL [Methocarbamol] 750 - 1,500 mg PO TID PRN #30 11/16/21 Unknown Rx





 tablet   











                                    Allergies











Allergy/AdvReac Type Severity Reaction Status Date / Time


 


No Known Allergies Allergy   Unverified 08/06/21 09:37














ED Review of Systems


ROS: 


Stated complaint: Vomiting, back pain


Other details as noted in HPI





Constitutional: denies: chills, fever, malaise


ENT: denies: throat pain


Respiratory: denies: cough, shortness of breath


Cardiovascular: chest pain


Gastrointestinal: abdominal pain, nausea, vomiting.  denies: diarrhea, cons

tipation, hematemesis, melena, hematochezia


Genitourinary: denies: urgency, dysuria, frequency, hematuria


Skin: denies: rash, change in color


Neurological: denies: numbness, paresthesias





ED Past Medical Hx





- Past Medical History


Previous Medical History?: No


Hx Hypertension: No


Hx Diabetes: No


Hx Deep Vein Thrombosis: No


Hx Renal Disease: No


Hx Sickle Cell Disease: No


Hx Seizures: No


Hx Asthma: No


Hx HIV: No





- Surgical History


Past Surgical History?: No





- Social History


Smoking Status: Never Smoker





- Medications


Home Medications: 


                                Home Medications











 Medication  Instructions  Recorded  Confirmed  Last Taken  Type


 


Tylenol See Protocol PO DAILY 11/16/21 11/16/21 Unknown History


 


methocarbamoL [Methocarbamol] 750 - 1,500 mg PO TID PRN #30 11/16/21  Unknown Rx





 tablet    














ED Physical Exam





- General


Limitations: No Limitations


General appearance: alert, other (Patient appears very uncomfortable)





- Head


Head exam: Present: atraumatic, normocephalic





- Eye


Eye exam: Present: normal appearance.  Absent: scleral icterus





- Neck


Neck exam: Present: normal inspection





- Respiratory


Respiratory exam: Present: normal lung sounds bilaterally.  Absent: respiratory 

distress





- Cardiovascular


Cardiovascular Exam: Present: normal rhythm, tachycardia (Mild)





- GI/Abdominal


GI/Abdominal exam: Present: soft, tenderness (Mid abdomen), normal bowel sounds.

  Absent: distended, rigid





- Neurological Exam


Neurological exam: Present: alert, oriented X3, normal gait





- Psychiatric


Psychiatric exam: Present: normal affect, normal mood





ED Course


                                   Vital Signs











  11/16/21 11/16/21 11/16/21





  08:17 09:30 09:46


 


Temperature 97.9 F  


 


Pulse Rate 104 H 97 H 87


 


Respiratory 20 14 19





Rate   


 


Blood Pressure   161/105


 


Blood Pressure 164/111  





[Right]   


 


O2 Sat by Pulse 98  100





Oximetry   














  11/16/21 11/16/21 11/16/21





  10:00 10:02 10:16


 


Temperature  98.6 F 


 


Pulse Rate 91 H 81 90


 


Respiratory 20 20 15





Rate   


 


Blood Pressure 161/105  135/103


 


Blood Pressure   





[Right]   


 


O2 Sat by Pulse 99 99 98





Oximetry   














  11/16/21 11/16/21





  11:24 12:30


 


Temperature  


 


Pulse Rate  


 


Respiratory  





Rate  


 


Blood Pressure 135/103 121/86


 


Blood Pressure  





[Right]  


 


O2 Sat by Pulse 98 





Oximetry  














ED Medical Decision Making





- Lab Data


Result diagrams: 


                                 11/16/21 09:13





                                 11/16/21 09:13








                                   Lab Results











  11/16/21 11/16/21 11/16/21 Range/Units





  09:13 09:13 09:13 


 


WBC  5.7    (4.5-11.0)  K/mm3


 


RBC  5.79 H    (3.65-5.03)  M/mm3


 


Hgb  14.0    (10.1-14.3)  gm/dl


 


Hct  45.7 H    (30.3-42.9)  %


 


MCV  79    (79-97)  fl


 


MCH  24 L    (28-32)  pg


 


MCHC  31    (30-34)  %


 


RDW  14.9    (13.2-15.2)  %


 


Plt Count  253    (140-440)  K/mm3


 


Lymph % (Auto)  25.4    (13.4-35.0)  %


 


Mono % (Auto)  7.3    (0.0-7.3)  %


 


Eos % (Auto)  4.7 H    (0.0-4.3)  %


 


Baso % (Auto)  0.5    (0.0-1.8)  %


 


Lymph # (Auto)  1.5    (1.2-5.4)  K/mm3


 


Mono # (Auto)  0.4    (0.0-0.8)  K/mm3


 


Eos # (Auto)  0.3    (0.0-0.4)  K/mm3


 


Baso # (Auto)  0.0    (0.0-0.1)  K/mm3


 


Seg Neutrophils %  62.1    (40.0-70.0)  %


 


Seg Neutrophils #  3.6    (1.8-7.7)  K/mm3


 


D-Dimer    971.52 H  (0-234)  ng/mlDDU


 


Sodium   139   (137-145)  mmol/L


 


Potassium   4.6   (3.6-5.0)  mmol/L


 


Chloride   102.9   ()  mmol/L


 


Carbon Dioxide   20 L   (22-30)  mmol/L


 


Anion Gap   21   mmol/L


 


BUN   15   (7-17)  mg/dL


 


Creatinine   0.6   (0.6-1.2)  mg/dL


 


Estimated GFR   > 60   ml/min


 


BUN/Creatinine Ratio   25   %


 


Glucose   99   ()  mg/dL


 


Calcium   10.0   (8.4-10.2)  mg/dL


 


Total Bilirubin   0.20   (0.1-1.2)  mg/dL


 


AST   36   (5-40)  units/L


 


ALT   27   (7-56)  units/L


 


Alkaline Phosphatase   119   ()  units/L


 


Total Protein   8.0   (6.3-8.2)  g/dL


 


Albumin   4.5   (3.9-5)  g/dL


 


Albumin/Globulin Ratio   1.3   %


 


Lipase   31   (13-60)  units/L


 


HCG, Qual     (Negative)  


 


Urine Color     (Yellow)  


 


Urine Turbidity     (Clear)  


 


Urine pH     (5.0-7.0)  


 


Ur Specific Gravity     (1.003-1.030)  


 


Urine Protein     (Negative)  mg/dL


 


Urine Glucose (UA)     (Negative)  mg/dL


 


Urine Ketones     (Negative)  mg/dL


 


Urine Blood     (Negative)  


 


Urine Nitrite     (Negative)  


 


Ur Reducing Substances     


 


Urine Bilirubin     (Negative)  


 


Urine Ictotest     


 


Urine Urobilinogen     (<2.0)  mg/dL


 


Ur Leukocyte Esterase     (Negative)  


 


Urine WBC (Auto)     (0.0-6.0)  /HPF


 


Urine RBC (Auto)     (0.0-6.0)  /HPF


 


U Epithel Cells (Auto)     (0-13.0)  /HPF


 


Urine Mucus     /HPF


 


Urine HCG, Qual     (Negative)  














  11/16/21 11/16/21 Range/Units





  09:42 12:36 


 


WBC    (4.5-11.0)  K/mm3


 


RBC    (3.65-5.03)  M/mm3


 


Hgb    (10.1-14.3)  gm/dl


 


Hct    (30.3-42.9)  %


 


MCV    (79-97)  fl


 


MCH    (28-32)  pg


 


MCHC    (30-34)  %


 


RDW    (13.2-15.2)  %


 


Plt Count    (140-440)  K/mm3


 


Lymph % (Auto)    (13.4-35.0)  %


 


Mono % (Auto)    (0.0-7.3)  %


 


Eos % (Auto)    (0.0-4.3)  %


 


Baso % (Auto)    (0.0-1.8)  %


 


Lymph # (Auto)    (1.2-5.4)  K/mm3


 


Mono # (Auto)    (0.0-0.8)  K/mm3


 


Eos # (Auto)    (0.0-0.4)  K/mm3


 


Baso # (Auto)    (0.0-0.1)  K/mm3


 


Seg Neutrophils %    (40.0-70.0)  %


 


Seg Neutrophils #    (1.8-7.7)  K/mm3


 


D-Dimer    (0-234)  ng/mlDDU


 


Sodium    (137-145)  mmol/L


 


Potassium    (3.6-5.0)  mmol/L


 


Chloride    ()  mmol/L


 


Carbon Dioxide    (22-30)  mmol/L


 


Anion Gap    mmol/L


 


BUN    (7-17)  mg/dL


 


Creatinine    (0.6-1.2)  mg/dL


 


Estimated GFR    ml/min


 


BUN/Creatinine Ratio    %


 


Glucose    ()  mg/dL


 


Calcium    (8.4-10.2)  mg/dL


 


Total Bilirubin    (0.1-1.2)  mg/dL


 


AST    (5-40)  units/L


 


ALT    (7-56)  units/L


 


Alkaline Phosphatase    ()  units/L


 


Total Protein    (6.3-8.2)  g/dL


 


Albumin    (3.9-5)  g/dL


 


Albumin/Globulin Ratio    %


 


Lipase    (13-60)  units/L


 


HCG, Qual  Negative   (Negative)  


 


Urine Color   Yellow  (Yellow)  


 


Urine Turbidity   Hazy  (Clear)  


 


Urine pH   5.0  (5.0-7.0)  


 


Ur Specific Gravity   1.043 H  (1.003-1.030)  


 


Urine Protein   <15 mg/dl  (Negative)  mg/dL


 


Urine Glucose (UA)   Neg  (Negative)  mg/dL


 


Urine Ketones   Neg  (Negative)  mg/dL


 


Urine Blood   Sm  (Negative)  


 


Urine Nitrite   Neg  (Negative)  


 


Ur Reducing Substances   Not Reportable  


 


Urine Bilirubin   Neg  (Negative)  


 


Urine Ictotest   Not Reportable  


 


Urine Urobilinogen   < 2.0  (<2.0)  mg/dL


 


Ur Leukocyte Esterase   Neg  (Negative)  


 


Urine WBC (Auto)   4.0  (0.0-6.0)  /HPF


 


Urine RBC (Auto)   1.0  (0.0-6.0)  /HPF


 


U Epithel Cells (Auto)   25.0 H  (0-13.0)  /HPF


 


Urine Mucus   1+  /HPF


 


Urine HCG, Qual   Negative  (Negative)  














- Radiology Data


Radiology results: report reviewed





 


 


CTA CHEST WITH IV CONTRAST  


 


 INDICATION:  


 + dimer, tearing thoracic pain, 3 wk postpartum.  


 


 TECHNIQUE:  


 Axial CT images were obtained through the chest after injection of IV contrast.

 3 plane MIP 


reconstructions were produced. All CT scans at this location are performed using

 CT dose reduction 


for ALARA by means of automated exposure control.   


 


 COMPARISON:  


 None available.  


 


 FINDINGS:  


 Pulmonary Arteries: No pulmonary emboli.  


 


 Lungs: No significant abnormality.  


 Trachea and Bronchi:  No significant abnormality.  


 


 Heart and Pericardium: No significant abnormality.  


 Vasculature: No significant abnormality.  


 Lymphatics: No lymphadenopathy.  


 


 Additional Findings: None.  


 


 Upper Abdomen: No acute findings.  


 


 Skeletal Structures: No acute findings or aggressive bone lesions.  


 


 IMPRESSION:  


 1. No CT evidence for pulmonary embolism.   


 2. No acute findings.  





- Medical Decision Making








26-year-old -American female patient presents with complaints of mid back

 pain and abdominal pain x3 weeks, worsening over the past 24 hours.  She states

 the pain in her back feels like spasms and that it radiates through into her 

abdomen.  Patient states she is 3 weeks postpartum and denies any complications 

from her vaginal delivery.  No other past medical history per patient.  She 

reports nausea and vomiting without hematemesis/coffee-ground emesis.  She also 

denies any fever/chills/sweats or difficulty breathing.





PERC score = 1.  Patient also at risk of clot due to recent pregnancy.  Dimer 

positive.  CTA chest and abdomen is negative for any acute abnormalities.  

Patient's pain has resolved with meds given here in the ED. blood pressure 

improved with pain control is now 126/86.  She admits to hypertension during her

 pregnancy that was monitored closely without medication.  She is stable for 

discharge home and follow-up with PCP.  Discussed in detail signs and symptoms 

that should prompt immediate return to the ED with patient who verbalizes 

understanding.


Critical care attestation.: 


If time is entered above; I have spent that time in minutes in the direct care 

of this critically ill patient, excluding procedure time.








ED Disposition


Clinical Impression: 


 Back pain, Abdominal pain, Muscle spasm





Disposition: 01 HOME / SELF CARE / HOMELESS


Is pt being admited?: No


Condition: Stable


Instructions:  Muscle Cramps and Spasms, Abdominal Pain, Adult, Easy-to-Read


Prescriptions: 


methocarbamoL [Methocarbamol] 750 - 1,500 mg PO TID PRN #30 tablet


 PRN Reason: Muscle spasms


Referrals: 


PRIMARY CARE,MD [Primary Care Provider] - 3-5 Days

## 2021-11-16 NOTE — EMERGENCY DEPARTMENT REPORT
ED General Adult HPI





- General


Chief complaint: Abdominal Pain


Stated complaint: MUSCLE SPASMS


PUI?: No


Time Seen by Provider: 11/16/21 23:36


Source: EMS


Mode of arrival: Stretcher


Limitations: No Limitations





- History of Present Illness


Initial comments: 





CC: back pain, abdominal pain





HPI:  THis is a 26-year-old female who is 3 weeks postpartum vaginal delivery.  

She has had back pain abdominal pain since delivering her baby.  She required 3 

different attempts at epidural insertion.  She feels that the several symptoms 

have caused persistent back pain.  Today she was evaluated by my colleague.  CT 

abdomen pelvis revealed no significant abnormality.  CT chest angiogram negative

for significant abnormality.  She is only formula feeding her child.  She is not

breast-feeding.  She feels body spasms.





She has been in good mood.  She denies depression.


-: Gradual, week(s) (3 weeks worse over the last day)


Location: back (Central right flank pain in the back, abdominal cramping)


Consistency: intermittent


Improves with: none


Worsens with: none


Associated Symptoms: denies other symptoms





- Related Data


                                Home Medications











 Medication  Instructions  Recorded  Confirmed  Last Taken


 


Tylenol See Protocol PO DAILY 11/16/21 11/16/21 Unknown








                                  Previous Rx's











 Medication  Instructions  Recorded  Last Taken  Type


 


HYDROcodone/APAP 5-325 [[place] 1 each PO Q6HR PRN #15 tablet 11/16/21 Unknown Rx





5/325]    


 


Ibuprofen [Motrin 400 MG tab] 400 mg PO TID 5 Days #15 tablet 11/16/21 Unknown 

Rx


 


methocarbamoL [Methocarbamol] 750 - 1,500 mg PO TID PRN #30 11/16/21 Unknown Rx





 tablet   











                                    Allergies











Allergy/AdvReac Type Severity Reaction Status Date / Time


 


No Known Allergies Allergy   Unverified 08/06/21 09:37














ED Review of Systems


ROS: 


Stated complaint: MUSCLE SPASMS


Other details as noted in HPI





Comment: All other systems reviewed and negative


Constitutional: denies: chills, fever, malaise


Respiratory: denies: cough, shortness of breath


Cardiovascular: denies: chest pain


Gastrointestinal: abdominal pain


Musculoskeletal: back pain





ED Past Medical Hx





- Past Medical History


Previous Medical History?: No


Hx Hypertension: No


Hx Diabetes: No


Hx Deep Vein Thrombosis: No


Hx Renal Disease: No


Hx Sickle Cell Disease: No


Hx Seizures: No


Hx Asthma: No


Hx HIV: No





- Surgical History


Past Surgical History?: No





- Social History


Smoking Status: Never Smoker


Substance Use Type: None





- Medications


Home Medications: 


                                Home Medications











 Medication  Instructions  Recorded  Confirmed  Last Taken  Type


 


HYDROcodone/APAP 5-325 [[place] 1 each PO Q6HR PRN #15 tablet 11/16/21  Unknown Rx





5/325]     


 


Ibuprofen [Motrin 400 MG tab] 400 mg PO TID 5 Days #15 tablet 11/16/21  Unknown 

Rx


 


Tylenol See Protocol PO DAILY 11/16/21 11/16/21 Unknown History


 


methocarbamoL [Methocarbamol] 750 - 1,500 mg PO TID PRN #30 11/16/21  Unknown Rx





 tablet    














ED Physical Exam





- General


Limitations: No Limitations


General appearance: alert, in no apparent distress





- Head


Head exam: Present: atraumatic, normocephalic





- Eye


Eye exam: Present: normal appearance





- ENT


ENT exam: Present: mucous membranes moist





- Neck


Neck exam: Present: normal inspection, full ROM





- Respiratory


Respiratory exam: Present: normal lung sounds bilaterally.  Absent: respiratory 

distress, wheezes, rales, rhonchi





- Cardiovascular


Cardiovascular Exam: Present: regular rate, normal rhythm, normal heart sounds. 

 Absent: systolic murmur, diastolic murmur, rubs, gallop





- GI/Abdominal


GI/Abdominal exam: Present: soft, normal bowel sounds.  Absent: distended, t

enderness, guarding, rebound





- Extremities Exam


Extremities exam: Present: normal inspection





- Back Exam


Back exam: Present: normal inspection, full ROM, muscle spasm





- Neurological Exam


Neurological exam: Present: alert, oriented X3





- Psychiatric


Psychiatric exam: Present: normal affect, normal mood





- Skin


Skin exam: Present: warm, dry, intact, normal color.  Absent: rash





ED Course


                                   Vital Signs











  11/16/21





  22:17


 


Temperature 98.7 F


 


Pulse Rate 100 H


 


Respiratory 16





Rate 


 


Blood Pressure 136/88





[Left] 


 


O2 Sat by Pulse 100





Oximetry 














ED Medical Decision Making





- Medical Decision Making





Back abdominal pain with history of epidural analgesia: Patient extremities are 

neurologically intact, possibly mild adverse effect of epidural anesthesia.  

Referred to spine surgeon for follow-up.  No bowel or bladder incontinence.  

Prescribed ibuprofen Norco she was prescribed methocarbamol by my colleague.


Critical care attestation.: 


If time is entered above; I have spent that time in minutes in the direct care 

of this critically ill patient, excluding procedure time.








ED Disposition


Clinical Impression: 


 Back pain, Muscle spasm





Disposition: 01 HOME / SELF CARE / HOMELESS


Is pt being admited?: No


Does the pt Need Aspirin: No


Condition: Stable


Instructions:  Abdominal Pain (ED), Acute Back Pain, Adult


Prescriptions: 


Ibuprofen [Motrin 400 MG tab] 400 mg PO TID 5 Days #15 tablet


HYDROcodone/APAP 5-325 [[place] 5/325] 1 each PO Q6HR PRN #15 tablet


 PRN Reason: Pain


Referrals: 


MARCIO SALINAS II, MD [Staff Physician] - 3-5 Days

## 2021-11-16 NOTE — XRAY REPORT
CHEST 2 VIEWS 



INDICATION / CLINICAL INFORMATION:

pain.



COMPARISON: 

None available.



FINDINGS:



SUPPORT DEVICES: None.



HEART / MEDIASTINUM: No significant abnormality. 



LUNGS / PLEURA: No significant pulmonary or pleural abnormality. No pneumothorax. 



ADDITIONAL FINDINGS: No significant additional findings.



IMPRESSION:

1. No acute findings.



Signer Name: Wilbert Valenzuela MD 

Signed: 11/16/2021 11:20 AM

Workstation Name: [email or web address]

## 2021-11-16 NOTE — CAT SCAN REPORT
CT ABDOMEN AND PELVIS WITH CONTRAST



HISTORY: abdominal pain, 3 weeks postpartum



COMPARISON: None



TECHNIQUE: Routine abdominal and pelvic CT exam performed  following intravenous contrast administrat
ion.. All CT scans at this location are performed using CT dose reduction for ALARA by means of autom
ated exposure control.



FINDINGS:



CT ABDOMEN:

Lung Bases: No significant abnormality.

Liver: No significant abnormality.

Biliary: No significant abnormality.

Spleen: No significant abnormality.  Unenlarged.

Pancreas: No significant abnormality.

Adrenals: No significant abnormality.

Kidneys: No significant abnormality.

Lymphatics: No lymphadenopathy.

Vasculature: No significant abnormality. 

Bowel/Peritoneum: No significant abnormality. No free air. No free fluid.



CT PELVIC:

: Uterus is mildly enlarged consistent with recent pregnancy. There are no fluid collections in the
 pelvis. There is no free fluid.

Lymphatics: No lymphadenopathy.



Osseous Structures: No aggressive appearing osseous lesions.

Additional Findings: None



IMPRESSION:

1. No significant abnormality.



Signer Name: Wilbert Valenzuela MD 

Signed: 11/16/2021 11:38 AM

Workstation Name: [id number]

## 2021-11-16 NOTE — CAT SCAN REPORT
CTA CHEST WITH IV CONTRAST



INDICATION:

+ dimer, tearing thoracic pain, 3 wk postpartum.



TECHNIQUE:

Axial CT images were obtained through the chest after injection of IV contrast. 3 plane MIP reconstru
ctions were produced. All CT scans at this location are performed using CT dose reduction for ALARA b
y means of automated exposure control. 



COMPARISON:

None available.



FINDINGS:

Pulmonary Arteries: No pulmonary emboli.



Lungs: No significant abnormality.

Trachea and Bronchi:  No significant abnormality.



Heart and Pericardium: No significant abnormality.

Vasculature: No significant abnormality.

Lymphatics: No lymphadenopathy.



Additional Findings: None.



Upper Abdomen: No acute findings.



Skeletal Structures: No acute findings or aggressive bone lesions.



IMPRESSION:

1. No CT evidence for pulmonary embolism. 

2. No acute findings.



Signer Name: Wilbert Valenzuela MD 

Signed: 11/16/2021 11:36 AM

Workstation Name: [id number]

## 2021-12-15 ENCOUNTER — HOSPITAL ENCOUNTER (EMERGENCY)
Dept: HOSPITAL 5 - ED | Age: 26
End: 2021-12-15
Payer: COMMERCIAL

## 2021-12-15 VITALS — SYSTOLIC BLOOD PRESSURE: 124 MMHG | DIASTOLIC BLOOD PRESSURE: 86 MMHG

## 2021-12-15 DIAGNOSIS — Z53.21: ICD-10-CM

## 2021-12-15 DIAGNOSIS — M79.601: Primary | ICD-10-CM
